# Patient Record
Sex: FEMALE | Race: BLACK OR AFRICAN AMERICAN | NOT HISPANIC OR LATINO | Employment: UNEMPLOYED | URBAN - METROPOLITAN AREA
[De-identification: names, ages, dates, MRNs, and addresses within clinical notes are randomized per-mention and may not be internally consistent; named-entity substitution may affect disease eponyms.]

---

## 2021-05-25 ENCOUNTER — RECORDS - HEALTHEAST (OUTPATIENT)
Dept: ADMINISTRATIVE | Facility: CLINIC | Age: 36
End: 2021-05-25

## 2021-05-27 ENCOUNTER — RECORDS - HEALTHEAST (OUTPATIENT)
Dept: ADMINISTRATIVE | Facility: CLINIC | Age: 36
End: 2021-05-27

## 2021-05-28 ENCOUNTER — RECORDS - HEALTHEAST (OUTPATIENT)
Dept: ADMINISTRATIVE | Facility: CLINIC | Age: 36
End: 2021-05-28

## 2021-05-30 ENCOUNTER — RECORDS - HEALTHEAST (OUTPATIENT)
Dept: ADMINISTRATIVE | Facility: CLINIC | Age: 36
End: 2021-05-30

## 2021-11-15 ENCOUNTER — TRANSFERRED RECORDS (OUTPATIENT)
Dept: HEALTH INFORMATION MANAGEMENT | Facility: CLINIC | Age: 36
End: 2021-11-15
Payer: COMMERCIAL

## 2021-11-19 ENCOUNTER — TRANSFERRED RECORDS (OUTPATIENT)
Dept: HEALTH INFORMATION MANAGEMENT | Facility: CLINIC | Age: 36
End: 2021-11-19
Payer: COMMERCIAL

## 2023-04-24 ENCOUNTER — HOSPITAL ENCOUNTER (EMERGENCY)
Facility: HOSPITAL | Age: 38
Discharge: HOME | End: 2023-04-24
Attending: EMERGENCY MEDICINE | Admitting: EMERGENCY MEDICINE
Payer: COMMERCIAL

## 2023-04-24 VITALS
TEMPERATURE: 98 F | SYSTOLIC BLOOD PRESSURE: 128 MMHG | RESPIRATION RATE: 16 BRPM | DIASTOLIC BLOOD PRESSURE: 81 MMHG | BODY MASS INDEX: 43.39 KG/M2 | HEART RATE: 79 BPM | OXYGEN SATURATION: 98 % | WEIGHT: 270 LBS | HEIGHT: 66 IN

## 2023-04-24 DIAGNOSIS — M62.838 TRAPEZIUS MUSCLE SPASM: ICD-10-CM

## 2023-04-24 PROCEDURE — 20552 NJX 1/MLT TRIGGER POINT 1/2: CPT

## 2023-04-24 PROCEDURE — 99283 EMERGENCY DEPT VISIT LOW MDM: CPT | Mod: 25

## 2023-04-24 RX ORDER — CYCLOBENZAPRINE HCL 10 MG
10 TABLET ORAL 3 TIMES DAILY PRN
Qty: 21 TABLET | Refills: 0 | Status: SHIPPED | OUTPATIENT
Start: 2023-04-24

## 2023-04-24 RX ORDER — CYCLOBENZAPRINE HCL 10 MG
10 TABLET ORAL 3 TIMES DAILY PRN
Qty: 21 TABLET | Refills: 0 | Status: SHIPPED | OUTPATIENT
Start: 2023-04-24 | End: 2023-04-24

## 2023-04-24 ASSESSMENT — ACTIVITIES OF DAILY LIVING (ADL): ADLS_ACUITY_SCORE: 35

## 2023-04-24 NOTE — ED PROVIDER NOTES
EMERGENCY DEPARTMENT ENCOUNTER      NAME: Regina Tran  AGE: 37 year old female  YOB: 1985  MRN: [id number]  EVALUATION DATE & TIME: No admission date for patient encounter.    PCP: No primary care provider on file.    ED PROVIDER: Ruba Lewis MD    Chief Complaint   Patient presents with     Arm Pain         FINAL IMPRESSION:  1. Trapezius muscle spasm          ED COURSE & MEDICAL DECISION MAKING:    Pertinent Labs & Imaging studies reviewed. (See chart for details)  37 year old female with history of mood disorder, anxiety/depression who presents to the Emergency Department for evaluation of 1+ month of left-sided shoulder pain radiating down the left arm with palpable spasm to the trapezius muscle and reproducible pain with range of motion on examination.  Symptoms seem consistent with trapezius muscle spasm, strain.  She has been using Tylenol, ibuprofen and tizanidine without relief.  Discussed trigger point injection with her here, which she was agreeable to.  Please see procedure note.  Given prescription for Flexeril.  Encourage PCP follow-up and physical therapy.  Discharged home.      ED Course as of 04/24/23 1324   Mon Apr 24, 2023   1232 I met with the patient, obtained history, performed an initial exam, and discussed options and plan for diagnostics and treatment here in the ED.     1247 Preformed trigger point injection procedure    1251 PDMP reviewed, no rx       Medical Decision Making    History:    Supplemental history from: Family Member/Significant Other    External Record(s) reviewed: Prior Imaging: Left-sided shoulder x-ray 4/17/2023    Work Up:    Chart documentation includes differential considered and any EKGs or imaging independently interpreted by provider, where specified.    In additional to work up documented, I considered the following work up: na    External consultation:    Discussion of management with another provider: na    Complicating factors:    Care  impacted by chronic illness: Mental Health    Care affected by social determinants of health: N/A    Disposition considerations: Discharge. I prescribed additional prescription strength medication(s) as charted. N/A.        At the conclusion of the encounter I discussed the results of all of the tests and the disposition. The questions were answered. The patient or family acknowledged understanding and was agreeable with the care plan.    MEDICATIONS GIVEN IN THE EMERGENCY:  Medications - No data to display    NEW PRESCRIPTIONS STARTED AT TODAY'S ER VISIT  Discharge Medication List as of 4/24/2023  1:07 PM      START taking these medications    Details   cyclobenzaprine (FLEXERIL) 10 MG tablet Take 1 tablet (10 mg) by mouth 3 times daily as needed for muscle spasms, Disp-21 tablet, R-0, Local Print                =================================================================    HPI    Patient information was obtained from: Patient     Use of Intrepreter: N/A        Regina Tran is a 37 year old female with no pertinent medical history who presents with left shoulder pain.     For the past month, patient has felt left shoulder pain. States she has been seen for this and was prescribed tizanidine but states it does not help. Patient states she has tried taking tylenol and ibuprofen for this but it has not alleviated any pain. She states pain is now radiating into his neck. Patient denies any slips for falls. Denies any other complaints at this time.       PAST MEDICAL HISTORY:  History reviewed. No pertinent past medical history.    PAST SURGICAL HISTORY:  Past Surgical History:   Procedure Laterality Date     CHOLECYSTECTOMY         CURRENT MEDICATIONS:    Prior to Admission Medications   Prescriptions Last Dose Informant Patient Reported? Taking?   Ondansetron HCl (ZOFRAN PO)   Yes No   Prenatal Vit-Fe Fumarate-FA (PRENATAL MULTIVITAMIN  PLUS IRON) 27-0.8 MG TABS   Yes No   Sig: Take 1 tablet by mouth daily  "     Facility-Administered Medications: None       ALLERGIES:  No Known Allergies    FAMILY HISTORY:  History reviewed. No pertinent family history.    SOCIAL HISTORY:  Social History     Tobacco Use     Smoking status: Every Day     Packs/day: 0.50     Types: Cigarettes   Substance Use Topics     Alcohol use: No        VITALS:  Patient Vitals for the past 24 hrs:   BP Temp Pulse Resp SpO2 Height Weight   04/24/23 1300 128/81 -- 79 -- 98 % -- --   04/24/23 1231 130/61 98  F (36.7  C) 79 16 97 % -- --   04/24/23 1230 -- -- -- -- -- 1.676 m (5' 6\") 122.5 kg (270 lb)       PHYSICAL EXAM    General Appearance: Well-appearing, well-nourished, no acute distress   Neck:  Supple, no midline tenderness to palpation  Chest:  No tenderness or deformity, no crepitus  Cardio:  Regular rate and rhythm  Pulm:  No respiratory distress  Back:  No midline tenderness to palpation, no paraspinal tenderness  Abdomen: Obese  Extremities:  Reproducible left trapezius muscle pain with palpable spasms.  Pain in this region with range of motion to the left shoulder though range of motion strength to the left shoulder is intact  Skin:  Skin warm, dry, no rashes  Neuro:  Alert and oriented ×3      PROCEDURES:    PROCEDURE: Trigger Point Injection   INDICATIONS: Left trapezius pain    PROCEDURE PROVIDER: Dr. Ruba Lewis    SITE:  Left trapezius muscle   MEDICATION:  1% lidocaine with epinephrine, 10 mL   NOTE: The skin overlying the site for injectionwas prepped with alcohol swab.  Using sterile technique I injected the medication.  A total of 10 ml of the medication was injected.  The patient had good response to the procedure.    COMPLICATIONS: None       The creation of this record is based on the scribe s observations of the work being performed by Ruba Lewis MD and the provider s statements to them. It was created on her behalf by Wen Saini, a trained medical scribe. This document has been checked and approved by the attending " provider.    Ruba Lewis MD  Emergency Medicine  [facility] EMERGENCY DEPARTMENT  [facility] [facility] [id number] [phone]  Dept: [phone]     Ruba Lewis MD  04/24/23 5075

## 2023-04-24 NOTE — ED TRIAGE NOTES
Pt comes in with L shoulder and arm pain x1 month. Pt does cleaning and stocking for work. No specific injury she is aware of. Ibuprofen 600mg last at 0700.